# Patient Record
Sex: FEMALE | Race: WHITE | NOT HISPANIC OR LATINO | Employment: FULL TIME | URBAN - METROPOLITAN AREA
[De-identification: names, ages, dates, MRNs, and addresses within clinical notes are randomized per-mention and may not be internally consistent; named-entity substitution may affect disease eponyms.]

---

## 2017-06-05 ENCOUNTER — TELEPHONE (OUTPATIENT)
Dept: OBSTETRICS AND GYNECOLOGY | Facility: CLINIC | Age: 47
End: 2017-06-05

## 2017-06-05 DIAGNOSIS — Z12.31 OTHER SCREENING MAMMOGRAM: Primary | ICD-10-CM

## 2017-06-05 NOTE — TELEPHONE ENCOUNTER
----- Message from Marga Michael sent at 6/5/2017 10:59 AM CDT -----  Contact: Patient  X _1st Request  _  2nd Request  _  3rd Request    Who:SUSSY ISBELL [[id number]]    Why:Patient needs orders placed in the system for her annual mammogram     What Number to Call Back:[phone]    When to Expect a call back: (Before the end of the day)   -- if call after 3:00 call back will be tomorrow.

## 2017-06-19 ENCOUNTER — HOSPITAL ENCOUNTER (OUTPATIENT)
Dept: RADIOLOGY | Facility: HOSPITAL | Age: 47
Discharge: HOME | End: 2017-06-19
Attending: OBSTETRICS & GYNECOLOGY
Payer: COMMERCIAL

## 2017-06-19 VITALS — BODY MASS INDEX: 35.15 KG/M2 | HEIGHT: 62 IN | WEIGHT: 191 LBS

## 2017-06-19 DIAGNOSIS — Z12.31 OTHER SCREENING MAMMOGRAM: ICD-10-CM

## 2017-06-19 PROCEDURE — 77067 SCR MAMMO BI INCL CAD: CPT | Mod: 26,,, | Performed by: RADIOLOGY

## 2017-06-19 PROCEDURE — 77063 BREAST TOMOSYNTHESIS BI: CPT | Mod: 26,,, | Performed by: RADIOLOGY

## 2017-06-19 PROCEDURE — 77067 SCR MAMMO BI INCL CAD: CPT | Mod: TC

## 2017-06-22 ENCOUNTER — TELEPHONE (OUTPATIENT)
Dept: OBSTETRICS AND GYNECOLOGY | Facility: CLINIC | Age: 47
End: 2017-06-22

## 2017-06-22 NOTE — TELEPHONE ENCOUNTER
Ms Newell is reqeusting blood work for her hormone and vitamin D levels,testing was suggested by her primary care provider. She would like testing done before annual exam scheduled 07/17/2017

## 2017-06-22 NOTE — TELEPHONE ENCOUNTER
----- Message from Kieran West sent at 6/22/2017  8:48 AM CDT -----  Contact: Patient  x_ 1st Request  _ 2nd Request  _ 3rd Request    Who: SUSSY ISBELL [[id number]]    Why: Patient is requesting lab orders to be placed for blood test to check her hormone and vitamin D levels per her PCP. Patient is needing a call back    What Number to Call Back: [phone]    When to Expect a call back: (Before the end of the day)  -- if call after 3:00 call back will be tomorrow.

## 2017-06-22 NOTE — TELEPHONE ENCOUNTER
----- Message from Melani Dawn sent at 6/22/2017 11:39 AM CDT -----  _  1st Request  X_  2nd Request  _  3rd Request        Who: SUSSY ISBELL [[id number]]    Why:  Pt returning a call from the office. Please call back.    What Number to Call Back:[phone]    When to Expect a call back: (Before the end of the day)   -- if the call is after 12:00, the call back will be tomorrow.

## 2017-06-23 NOTE — TELEPHONE ENCOUNTER
Left voice message for patient to call office back.     Per Dr. Garner,    PCP normally checks thyroid hormone and vitamin D level.  I do not normally check follicle stimulating hormone levels or estrogen levels unless a gyn problem is encountered.

## 2017-06-23 NOTE — TELEPHONE ENCOUNTER
----- Message from Kieran West sent at 6/23/2017 12:44 PM CDT -----  Contact: Patient  x_ 1st Request  _ 2nd Request  _ 3rd Request    Who: SUSSY ISBELL [[id number]]    Why: Patient states she spoke with staff on yesterday and was waiting for a return call back. Patient is needing a call back    What Number to Call Back: [phone]    When to Expect a call back: (Before the end of the day)  -- if call after 3:00 call back will be tomorrow.

## 2017-06-23 NOTE — TELEPHONE ENCOUNTER
Patient stated that her primary care provider suggested that she have blood work for hormones and vitamin D done. Patient stated she would like to have these labs drawn before her GYN visit. Patient stated she has called three times and still doesn't have an answer. Asked patient if her primary care put orders. Patient said no. Patient stated that she would call to see if her primary care would put the orders in for her and will call back to let us know.

## 2017-06-23 NOTE — TELEPHONE ENCOUNTER
PCP normally checks thyroid hormone and vitamin D level.  I do not normally check follicle stimulating hormone levels or estrogen levels unless a gyn problem is encountered.

## 2017-06-28 ENCOUNTER — TELEPHONE (OUTPATIENT)
Dept: OBSTETRICS AND GYNECOLOGY | Facility: CLINIC | Age: 47
End: 2017-06-28

## 2017-06-28 NOTE — TELEPHONE ENCOUNTER
----- Message from Olamide Whitley sent at 6/28/2017  9:52 AM CDT -----  Contact: self  Pt needing a call back, she have question(s), she can be reached at [phone].

## 2017-06-28 NOTE — TELEPHONE ENCOUNTER
"Patient called to discuss requested blood work prior to appointment. I discuss provider recommendation-PCP normally checks thyroid hormone and vitamin D level.  I do not normally check follicle stimulating hormone levels or estrogen levels unless a gyn problem is encountered. I advised patient that she could discuss request at the office visit and there were options for lab draw after visit. 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 Assure her results can be sent to portal. She verbalized understanding  "

## 2017-07-26 ENCOUNTER — OFFICE VISIT (OUTPATIENT)
Dept: OBSTETRICS AND GYNECOLOGY | Facility: CLINIC | Age: 47
End: 2017-07-26
Attending: OBSTETRICS & GYNECOLOGY
Payer: COMMERCIAL

## 2017-07-26 VITALS
DIASTOLIC BLOOD PRESSURE: 86 MMHG | BODY MASS INDEX: 35.22 KG/M2 | HEIGHT: 62 IN | WEIGHT: 191.38 LBS | SYSTOLIC BLOOD PRESSURE: 130 MMHG

## 2017-07-26 DIAGNOSIS — Z01.419 WELL FEMALE EXAM WITH ROUTINE GYNECOLOGICAL EXAM: Primary | ICD-10-CM

## 2017-07-26 PROCEDURE — 99396 PREV VISIT EST AGE 40-64: CPT | Mod: S$GLB,,, | Performed by: OBSTETRICS & GYNECOLOGY

## 2017-07-26 PROCEDURE — 99999 PR PBB SHADOW E&M-EST. PATIENT-LVL III: CPT | Mod: PBBFAC,,, | Performed by: OBSTETRICS & GYNECOLOGY

## 2017-07-26 RX ORDER — CARVEDILOL 3.12 MG/1
TABLET ORAL
COMMUNITY
Start: 2017-07-09 | End: 2018-08-01 | Stop reason: DRUGHIGH

## 2017-07-26 RX ORDER — FLUTICASONE PROPIONATE 50 MCG
SPRAY, SUSPENSION (ML) NASAL
COMMUNITY
Start: 2017-06-23

## 2017-07-26 NOTE — PROGRESS NOTES
SUBJECTIVE:   46 y.o. female   for routine gyn exam. Patient's last menstrual period was 2017 (approximate)..  She reports periods every month until -- was 8 week interval.  Has some anxiety, sleeping problems, hot flashes.  Had normal TSH.  Wondering if she needs hormones level checked.         Past Medical History:   Diagnosis Date    Hypertension      Past Surgical History:   Procedure Laterality Date    TONSILLECTOMY, ADENOIDECTOMY       Social History     Social History    Marital status: Single     Spouse name: N/A    Number of children: N/A    Years of education: N/A     Occupational History    Not on file.     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, single, no children, nonsmoker, alcohol once a month, [facility]                 Family History   Problem Relation Age of Onset    Hyperlipidemia Father     Hypertension Father     Multiple sclerosis Mother     Breast cancer Neg Hx     Colon cancer Neg Hx     Ovarian cancer Neg Hx      OB History    Para Term  AB Living   0   0         SAB TAB Ectopic Multiple Live Births           0               Current Outpatient Prescriptions   Medication Sig Dispense Refill    carvedilol (COREG) 3.125 MG tablet       fluticasone (FLONASE) 50 mcg/actuation nasal spray        No current facility-administered medications for this visit.      Allergies: No known drug allergies     ROS:  Constitutional: no weight loss, weight gain, fever, fatigue  Eyes:  No vision changes, glasses/contacts  ENT/Mouth: No ulcers, sinus problems, ears ringing, headache  Cardiovascular: No inability to lie flat, chest pain,  "exercise intolerance, swelling, heart palpitations  Respiratory: No wheezing, coughing blood, shortness of breath, or cough  Gastrointestinal: No diarrhea, bloody stool, nausea/vomiting, constipation, gas, hemorrhoids  Genitourinary: No blood in urine, painful urination, urgency of urination, frequency of urination, incomplete emptying, incontinence, abnormal bleeding, painful periods, heavy periods, vaginal discharge, vaginal odor, painful intercourse, sexual problems, bleeding after intercourse.  Musculoskeletal: No muscle weakness  Skin/Breast: No painful breasts, nipple discharge, masses, rash, ulcers  Neurological: No passing out, seizures, numbness, headache  Endocrine: No diabetes, hypothyroid, hyperthyroid, +hot flashes, hair loss, abnormal hair growth, ance  Psychiatric: No depression, crying  Hematologic: No bruises, bleeding, swollen lymph nodes, anemia.      OBJECTIVE:   The patient appears well, alert, oriented x 3, in no distress.  /86 (BP Location: Right arm, Patient Position: Sitting, BP Method: Manual)   Ht 5' 2" (1.575 m)   Wt 86.8 kg (191 lb 5.8 oz)   LMP 06/05/2017 (Approximate)   BMI 35.00 kg/m²   NECK: no thyromegaly, trachea midline  SKIN: no acne, striae, hirsutism  CHEST: CTAB  CV: RRR  BREAST EXAM: breasts appear normal, no suspicious masses, no skin or nipple changes or axillary nodes  ABDOMEN: no hernias, masses, or hepatosplenomegaly  GENITALIA: normal external genitalia, no erythema, no discharge  URETHRA: normal urethra, normal urethral meatus  VAGINA: Normal  CERVIX: no lesions or cervical motion tenderness  UTERUS: normal size, contour, position, consistency, mobility, non-tender  ADNEXA: no mass, fullness, tenderness      ASSESSMENT:   1. Well female exam with routine gynecological exam         PLAN:       Discussed oligomenorrhea and perimenopausal status.  Discussed hormone testing and tx of sx.  Discussed SSRI/ Brisdelle.  Return to clinic in 1 year  "

## 2018-08-01 ENCOUNTER — OFFICE VISIT (OUTPATIENT)
Dept: OBSTETRICS AND GYNECOLOGY | Facility: CLINIC | Age: 48
End: 2018-08-01
Attending: OBSTETRICS & GYNECOLOGY
Payer: COMMERCIAL

## 2018-08-01 VITALS
BODY MASS INDEX: 34.77 KG/M2 | WEIGHT: 188.94 LBS | SYSTOLIC BLOOD PRESSURE: 122 MMHG | DIASTOLIC BLOOD PRESSURE: 80 MMHG | HEIGHT: 62 IN

## 2018-08-01 DIAGNOSIS — Z01.419 WELL FEMALE EXAM WITH ROUTINE GYNECOLOGICAL EXAM: Primary | ICD-10-CM

## 2018-08-01 DIAGNOSIS — Z12.39 BREAST CANCER SCREENING: ICD-10-CM

## 2018-08-01 PROCEDURE — 99396 PREV VISIT EST AGE 40-64: CPT | Mod: S$GLB,,, | Performed by: OBSTETRICS & GYNECOLOGY

## 2018-08-01 PROCEDURE — 99999 PR PBB SHADOW E&M-EST. PATIENT-LVL III: CPT | Mod: PBBFAC,,, | Performed by: OBSTETRICS & GYNECOLOGY

## 2018-08-01 PROCEDURE — 88175 CYTOPATH C/V AUTO FLUID REDO: CPT

## 2018-08-01 RX ORDER — CARVEDILOL 12.5 MG/1
TABLET ORAL
COMMUNITY
Start: 2018-07-19 | End: 2019-08-02

## 2018-08-01 NOTE — PROGRESS NOTES
SUBJECTIVE:   47 y.o. female   for routine gyn exam. Patient's last menstrual period was 2018 (exact date)..  She has no unusual complaints.  Periods are becoming more scant.         Past Medical History:   Diagnosis Date    Hypertension      Past Surgical History:   Procedure Laterality Date    TONSILLECTOMY, ADENOIDECTOMY       Social History     Social History    Marital status: Single     Spouse name: N/A    Number of children: N/A    Years of education: N/A     Occupational History    Not on file.     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, single, no children, nonsmoker, alcohol once a month, [facility]                 Family History   Problem Relation Age of Onset    Hyperlipidemia Father     Hypertension Father     Multiple sclerosis Mother     Breast cancer Neg Hx     Colon cancer Neg Hx     Ovarian cancer Neg Hx      OB History    Para Term  AB Living   0 0 0 0 0 0   SAB TAB Ectopic Multiple Live Births   0 0 0 0 0               Current Outpatient Prescriptions   Medication Sig Dispense Refill    carvedilol (COREG) 12.5 MG tablet       fluticasone (FLONASE) 50 mcg/actuation nasal spray        No current facility-administered medications for this visit.      Allergies: No known drug allergies     ROS:  Constitutional: no weight loss, weight gain, fever, fatigue  Eyes:  No vision changes, glasses/contacts  ENT/Mouth: No ulcers, sinus problems, ears ringing, headache  Cardiovascular: No inability to lie flat, chest pain, exercise intolerance, swelling, heart palpitations  Respiratory: No wheezing, coughing blood, shortness of breath, or  "cough  Gastrointestinal: No diarrhea, bloody stool, nausea/vomiting, constipation, gas, hemorrhoids  Genitourinary: No blood in urine, painful urination, urgency of urination, frequency of urination, incomplete emptying, incontinence, abnormal bleeding, painful periods, heavy periods, vaginal discharge, vaginal odor, painful intercourse, sexual problems, bleeding after intercourse.  Musculoskeletal: No muscle weakness  Skin/Breast: No painful breasts, nipple discharge, masses, rash, ulcers  Neurological: No passing out, seizures, numbness, headache  Endocrine: No diabetes, hypothyroid, hyperthyroid, hot flashes, hair loss, abnormal hair growth, ance  Psychiatric: No depression, crying  Hematologic: No bruises, bleeding, swollen lymph nodes, anemia.      OBJECTIVE:   The patient appears well, alert, oriented x 3, in no distress.  /80 (BP Location: Right arm, Patient Position: Sitting, BP Method: Large (Manual))   Ht 5' 2" (1.575 m)   Wt 85.7 kg (188 lb 15 oz)   LMP 06/11/2018 (Exact Date)   BMI 34.56 kg/m²   NECK: no thyromegaly, trachea midline  SKIN: no acne, striae, hirsutism  CHEST: CTAB  CV: RRR  BREAST EXAM: breasts appear normal, no suspicious masses, no skin or nipple changes or axillary nodes  ABDOMEN: no hernias, masses, or hepatosplenomegaly  GENITALIA: normal external genitalia, no erythema, no discharge  URETHRA: normal urethra, normal urethral meatus  VAGINA: Normal  CERVIX: no lesions or cervical motion tenderness  UTERUS: normal size, contour, position, consistency, mobility, non-tender  ADNEXA: no mass, fullness, tenderness      ASSESSMENT:   1. Well female exam with routine gynecological exam  Liquid-based pap smear, screening   2. Breast cancer screening  Mammo Digital Screening Bilat with Tomosynthesis CAD       PLAN:   Orders Placed This Encounter    Mammo Digital Screening Bilat with Tomosynthesis CAD    Liquid-based pap smear, screening     Discussed perimenopause  Return to clinic " in 1 year

## 2018-08-06 ENCOUNTER — HOSPITAL ENCOUNTER (OUTPATIENT)
Dept: RADIOLOGY | Facility: HOSPITAL | Age: 48
Discharge: HOME | End: 2018-08-06
Attending: OBSTETRICS & GYNECOLOGY
Payer: COMMERCIAL

## 2018-08-06 DIAGNOSIS — Z12.39 BREAST CANCER SCREENING: ICD-10-CM

## 2018-08-06 PROCEDURE — 77063 BREAST TOMOSYNTHESIS BI: CPT | Mod: TC,PO

## 2018-08-06 PROCEDURE — 77063 BREAST TOMOSYNTHESIS BI: CPT | Mod: 26,,, | Performed by: RADIOLOGY

## 2018-08-06 PROCEDURE — 77067 SCR MAMMO BI INCL CAD: CPT | Mod: 26,,, | Performed by: RADIOLOGY

## 2019-08-02 ENCOUNTER — OFFICE VISIT (OUTPATIENT)
Dept: OBSTETRICS AND GYNECOLOGY | Facility: CLINIC | Age: 49
End: 2019-08-02
Payer: COMMERCIAL

## 2019-08-02 VITALS
BODY MASS INDEX: 31.52 KG/M2 | HEIGHT: 62 IN | DIASTOLIC BLOOD PRESSURE: 70 MMHG | WEIGHT: 171.31 LBS | SYSTOLIC BLOOD PRESSURE: 130 MMHG

## 2019-08-02 DIAGNOSIS — Z01.419 WELL FEMALE EXAM WITH ROUTINE GYNECOLOGICAL EXAM: Primary | ICD-10-CM

## 2019-08-02 DIAGNOSIS — Z12.39 BREAST CANCER SCREENING: ICD-10-CM

## 2019-08-02 PROCEDURE — 99999 PR PBB SHADOW E&M-EST. PATIENT-LVL III: CPT | Mod: PBBFAC,,, | Performed by: OBSTETRICS & GYNECOLOGY

## 2019-08-02 PROCEDURE — 99999 PR PBB SHADOW E&M-EST. PATIENT-LVL III: ICD-10-PCS | Mod: PBBFAC,,, | Performed by: OBSTETRICS & GYNECOLOGY

## 2019-08-02 PROCEDURE — 99396 PR PREVENTIVE VISIT,EST,40-64: ICD-10-PCS | Mod: S$GLB,,, | Performed by: OBSTETRICS & GYNECOLOGY

## 2019-08-02 PROCEDURE — 99396 PREV VISIT EST AGE 40-64: CPT | Mod: S$GLB,,, | Performed by: OBSTETRICS & GYNECOLOGY

## 2019-08-02 NOTE — PROGRESS NOTES
SUBJECTIVE:   48 y.o. female   for routine gyn exam. Patient's last menstrual period was 2019..  She has no unusual complaints.  No hot flashes.          Past Medical History:   Diagnosis Date    Hypertension      Past Surgical History:   Procedure Laterality Date    TONSILLECTOMY, ADENOIDECTOMY       Social History     Socioeconomic History    Marital status: Single     Spouse name: Not on file    Number of children: Not on file    Years of education: Not on file    Highest education level: Not on file   Occupational History    Not on file   Social Needs    Financial resource strain: Not on file    Food insecurity:     Worry: Not on file     Inability: Not on file    Transportation needs:     Medical: Not on file     Non-medical: Not on file   Tobacco Use    Smoking status: Never Smoker    Smokeless tobacco: Never Used   Substance and Sexual Activity    Alcohol use: Yes     Comment: on OCC/rarely    Drug use: No    Sexual activity: Not Currently     Partners: Male     Birth control/protection: None   Lifestyle    Physical activity:     Days per week: Not on file     Minutes per session: Not on file    Stress: Not on file   Relationships    Social connections:     Talks on phone: Not on file     Gets together: Not on file     Attends Zoroastrian service: Not on file     Active member of club or organization: Not on file     Attends meetings of clubs or organizations: Not on file     Relationship status: Not on file   Other Topics Concern    Not on file   Social History Narrative     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      Allergies: No known drug allergies     ROS:  Constitutional: no weight loss, weight gain, fever, fatigue  Eyes:  No vision changes, glasses/contacts  ENT/Mouth: No ulcers, sinus problems, ears ringing, headache  Cardiovascular: No inability to lie flat, chest pain, exercise intolerance, swelling, heart palpitations  Respiratory: No wheezing, coughing blood, shortness of breath, or cough  Gastrointestinal: No diarrhea, bloody stool, nausea/vomiting, constipation, gas, hemorrhoids  Genitourinary: No blood in urine, painful urination, urgency of urination, frequency of urination, incomplete emptying, incontinence, abnormal bleeding, painful periods, heavy periods, vaginal discharge, vaginal odor, painful intercourse, sexual problems, bleeding after intercourse.  Musculoskeletal: No muscle weakness  Skin/Breast: No painful breasts, nipple discharge, masses, rash, ulcers  Neurological: No passing out, seizures, numbness, headache  Endocrine: No diabetes, hypothyroid, hyperthyroid, hot flashes, hair loss, abnormal hair growth, ance  Psychiatric: No depression, crying  Hematologic: No bruises, bleeding, swollen lymph nodes, anemia.      OBJECTIVE:   The patient appears well, alert, oriented x 3, in no distress.  /70   Ht 5' 2" (1.575 m)   Wt 77.7 kg (171 lb 4.8 oz)   LMP 03/02/2019   BMI 31.33 kg/m²   NECK: no thyromegaly, trachea midline  SKIN: no acne, striae, hirsutism  CHEST: CTAB  CV: RRR  BREAST EXAM: breasts appear normal, no suspicious masses, no skin or nipple changes or axillary nodes  ABDOMEN: no hernias, masses, or hepatosplenomegaly  GENITALIA: normal external genitalia, no erythema, no discharge  URETHRA: normal urethra, normal urethral meatus  VAGINA: " Normal  CERVIX: no lesions or cervical motion tenderness  UTERUS: normal size, contour, position, consistency, mobility, non-tender  ADNEXA: no mass, fullness, tenderness      ASSESSMENT:   1. Well female exam with routine gynecological exam     2. Breast cancer screening  Mammo Digital Screening Bilat w/ Shaq       PLAN:   Orders Placed This Encounter    Mammo Digital Screening Bilat w/ Shaq     Discussed perimenopause/ menopause  Return to clinic in 1 year

## 2020-01-27 ENCOUNTER — TELEPHONE (OUTPATIENT)
Dept: OBSTETRICS AND GYNECOLOGY | Facility: CLINIC | Age: 50
End: 2020-01-27

## 2020-02-24 ENCOUNTER — HOSPITAL ENCOUNTER (OUTPATIENT)
Dept: RADIOLOGY | Facility: HOSPITAL | Age: 50
Discharge: HOME | End: 2020-02-24
Attending: OBSTETRICS & GYNECOLOGY
Payer: COMMERCIAL

## 2020-02-24 DIAGNOSIS — Z12.39 BREAST CANCER SCREENING: ICD-10-CM

## 2020-02-24 PROCEDURE — 77067 SCR MAMMO BI INCL CAD: CPT | Mod: TC,PO

## 2020-02-24 PROCEDURE — 77067 MAMMO DIGITAL SCREENING BILAT WITH TOMOSYNTHESIS_CAD: ICD-10-PCS | Mod: 26,,, | Performed by: RADIOLOGY

## 2020-02-24 PROCEDURE — 77067 SCR MAMMO BI INCL CAD: CPT | Mod: 26,,, | Performed by: RADIOLOGY

## 2020-02-24 PROCEDURE — 77063 MAMMO DIGITAL SCREENING BILAT WITH TOMOSYNTHESIS_CAD: ICD-10-PCS | Mod: 26,,, | Performed by: RADIOLOGY

## 2020-02-24 PROCEDURE — 77063 BREAST TOMOSYNTHESIS BI: CPT | Mod: 26,,, | Performed by: RADIOLOGY

## 2021-11-29 ENCOUNTER — CLINICAL SUPPORT (OUTPATIENT)
Dept: URGENT CARE | Facility: CLINIC | Age: 51
End: 2021-11-29
Payer: COMMERCIAL

## 2021-11-29 DIAGNOSIS — Z11.9 SCREENING EXAMINATION FOR UNSPECIFIED INFECTIOUS DISEASE: Primary | ICD-10-CM

## 2021-11-29 LAB
CTP QC/QA: YES
SARS-COV-2 RDRP RESP QL NAA+PROBE: NEGATIVE

## 2021-11-29 PROCEDURE — U0002: ICD-10-PCS | Mod: QW,S$GLB,, | Performed by: PHYSICIAN ASSISTANT

## 2021-11-29 PROCEDURE — 99211 PR OFFICE/OUTPT VISIT, EST, LEVL I: ICD-10-PCS | Mod: S$GLB,,, | Performed by: PHYSICIAN ASSISTANT

## 2021-11-29 PROCEDURE — U0002 COVID-19 LAB TEST NON-CDC: HCPCS | Mod: QW,S$GLB,, | Performed by: PHYSICIAN ASSISTANT

## 2021-11-29 PROCEDURE — 99211 OFF/OP EST MAY X REQ PHY/QHP: CPT | Mod: S$GLB,,, | Performed by: PHYSICIAN ASSISTANT

## 2023-09-10 ENCOUNTER — OFFICE VISIT (OUTPATIENT)
Dept: URGENT CARE | Facility: CLINIC | Age: 53
End: 2023-09-10
Payer: COMMERCIAL

## 2023-09-10 VITALS
DIASTOLIC BLOOD PRESSURE: 98 MMHG | TEMPERATURE: 98 F | HEIGHT: 62 IN | OXYGEN SATURATION: 96 % | WEIGHT: 171 LBS | RESPIRATION RATE: 16 BRPM | SYSTOLIC BLOOD PRESSURE: 171 MMHG | BODY MASS INDEX: 31.47 KG/M2 | HEART RATE: 100 BPM

## 2023-09-10 DIAGNOSIS — S61.451A DOG BITE OF RIGHT HAND, INITIAL ENCOUNTER: Primary | ICD-10-CM
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
## 2023-09-10 PROCEDURE — 90715 TDAP VACCINE 7 YRS/> IM: CPT | Mod: S$GLB,,, | Performed by: PHYSICIAN ASSISTANT

## 2023-09-10 PROCEDURE — 99213 OFFICE O/P EST LOW 20 MIN: CPT | Mod: 25,S$GLB,, | Performed by: PHYSICIAN ASSISTANT

## 2023-09-10 PROCEDURE — 90471 IMMUNIZATION ADMIN: CPT | Mod: S$GLB,,, | Performed by: PHYSICIAN ASSISTANT

## 2023-09-10 PROCEDURE — 12041 INTMD RPR N-HF/GENIT 2.5CM/<: CPT | Mod: S$GLB,,, | Performed by: PHYSICIAN ASSISTANT

## 2023-09-10 PROCEDURE — 90471 TDAP VACCINE GREATER THAN OR EQUAL TO 7YO IM: ICD-10-PCS | Mod: S$GLB,,, | Performed by: PHYSICIAN ASSISTANT

## 2023-09-10 PROCEDURE — 99213 PR OFFICE/OUTPT VISIT, EST, LEVL III, 20-29 MIN: ICD-10-PCS | Mod: 25,S$GLB,, | Performed by: PHYSICIAN ASSISTANT

## 2023-09-10 PROCEDURE — 12041 LACERATION REPAIR: ICD-10-PCS | Mod: S$GLB,,, | Performed by: PHYSICIAN ASSISTANT

## 2023-09-10 PROCEDURE — 90715 TDAP VACCINE GREATER THAN OR EQUAL TO 7YO IM: ICD-10-PCS | Mod: S$GLB,,, | Performed by: PHYSICIAN ASSISTANT

## 2023-09-10 RX ORDER — MUPIROCIN 20 MG/G
OINTMENT TOPICAL
Status: COMPLETED | OUTPATIENT
Start: 2023-09-10 | End: 2023-09-10

## 2023-09-10 RX ORDER — AMOXICILLIN AND CLAVULANATE POTASSIUM 875; 125 MG/1; MG/1
1 TABLET, FILM COATED ORAL EVERY 12 HOURS
Qty: 14 TABLET | Refills: 0 | Status: SHIPPED | OUTPATIENT
Start: 2023-09-10 | End: 2023-09-17

## 2023-09-10 RX ADMIN — MUPIROCIN: 20 OINTMENT TOPICAL at 04:09

## 2023-09-10 NOTE — PROGRESS NOTES
"ssssssssssssss  Subjective:      Patient ID: Cynthia Newell is a 52 y.o. female.    Vitals:  height is 5' 2" (1.575 m) and weight is 77.6 kg (171 lb). Her oral temperature is 98.1 °F (36.7 °C). Her blood pressure is 171/98 (abnormal) and her pulse is 100. Her respiration is 16 and oxygen saturation is 96%.     Chief Complaint: Animal Bite    This is a 52 y.o. female who presents today with a chief complaint of dog bite on R hand that happened today about 2 hours ago. Pt stated that it was her neighbors dog that bit her. Pt was throwing her trash out and pts neighbor was holding a baby when the patient approach her neighbor and saw that the dog was on a leash and when the patient got ready to pet the dog she had her hand out for the dog to sniff her and the dog bit her hand.   Pt stated that the neighbor said that the dog just got all its shots.     Animal Bite   The incident occurred today. She came to the ER via personal transport. There is an injury to the Right hand. There is an injury to the Right ring finger and right little finger. The patient is experiencing no pain. It is unlikely that a foreign body is present. Pertinent negatives include no chest pain, no numbness and no weakness. There have been no prior injuries to these areas. She is Left-handed. Her tetanus status is unknown. There were no sick contacts. She has received no recent medical care.     Constitution: Negative for chills and fever.   Cardiovascular:  Negative for chest pain.   Respiratory:  Negative for shortness of breath.    Musculoskeletal:  Positive for pain, trauma and muscle ache. Negative for joint pain, joint swelling, abnormal ROM of joint and muscle cramps.   Skin:  Positive for laceration. Negative for erythema and bruising.   Neurological:  Negative for numbness, tingling and tremors.      Past Medical History:   Diagnosis Date    Hypertension        Past Surgical History:   Procedure Laterality Date    TONSILLECTOMY, " ADENOIDECTOMY         Family History   Problem Relation Age of Onset    Hyperlipidemia Father     Hypertension Father     Multiple sclerosis Mother     Breast cancer Neg Hx     Colon cancer Neg Hx     Ovarian cancer Neg Hx        Social History     Socioeconomic History    Marital status: Single   Tobacco Use    Smoking status: Never    Smokeless tobacco: Never   Substance and Sexual Activity    Alcohol use: Yes     Comment: on OCC/rarely    Drug use: No    Sexual activity: Not Currently     Partners: Male     Birth control/protection: None   Social History Narrative     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 for 7 days 14 tablet 0     Current Facility-Administered Medications   Medication Dose Route Frequency Provider Last Rate Last Admin    mupirocin 2 % ointment   Topical (Top) 1 time in Clinic/HOD Judy Obregon PA-C           Review of patient's allergies indicates:   Allergen Reactions    No known drug allergies        Objective:     Physical Exam   Constitutional: She is oriented to person, place, and time.  Non-toxic appearance. She does not appear ill. No distress.   Cardiovascular: Normal rate, regular rhythm and normal pulses.   Pulmonary/Chest: Effort normal. No respiratory distress.   Abdominal: Normal appearance.   Musculoskeletal: Normal range of motion.         General: Tenderness present. No swelling. Normal range of motion.      Right hand: She exhibits normal capillary refill. Decreased sensation is not present in the ulnar distribution, is not present in the medial distribution and is not present in the radial distribution. Motor /Testing: The patient has  "normal right wrist strength.        Hands:    Neurological: no focal deficit. She is alert and oriented to person, place, and time. She displays no weakness. No sensory deficit. Coordination normal.   Skin: Skin is warm, dry, not diaphoretic, not pale and no rash. Capillary refill takes less than 2 seconds. No erythema   Psychiatric: Her behavior is normal. Mood, judgment and thought content normal.   Nursing note and vitals reviewed.          Laceration Repair    Date/Time: 9/10/2023 3:30 PM    Performed by: Judy Obregon PA-C  Authorized by: Judy Obregon PA-C  Consent Done: Yes  Consent: Verbal consent obtained.  Risks and benefits: risks, benefits and alternatives were discussed  Consent given by: patient  Patient understanding: patient states understanding of the procedure being performed  Required items: required blood products, implants, devices, and special equipment available  Patient identity confirmed:  and name  Time out: Immediately prior to procedure a "time out" was called to verify the correct patient, procedure, equipment, support staff and site/side marked as required.  Body area: upper extremity  Location details: right hand  Laceration length: 1.5 cm  Foreign bodies: no foreign bodies  Tendon involvement: none  Nerve involvement: none  Vascular damage: no  Anesthesia: local infiltration    Anesthesia:  Local Anesthetic: lidocaine 1% without epinephrine  Anesthetic total: 1.5 mL    Patient sedated: no  Preparation: Patient was prepped and draped in the usual sterile fashion.  Irrigation solution: saline  Irrigation method: syringe  Amount of cleaning: extensive  Debridement: none  Degree of undermining: none  Skin closure: 4-0 Prolene  Number of sutures: 2  Technique: simple  Approximation: loose  Approximation difficulty: simple  Dressing: antibiotic ointment, 4x4 sterile gauze and splint for protection  Patient tolerance: Patient tolerated the procedure well with no immediate " complications          Assessment:     1. Dog bite of right hand, initial encounter        Plan:       Dog bite of right hand, initial encounter  -     (In Office Administered) Tdap Vaccine  -     SPLINT FOR HOME USE  -     amoxicillin-clavulanate 875-125mg (AUGMENTIN) 875-125 mg per tablet; Take 1 tablet by mouth every 12 (twelve) hours. for 7 days  Dispense: 14 tablet; Refill: 0  -     mupirocin 2 % ointment  -     Laceration Repair    Discussed patient that we normally do not tried to close dog bites however due to her is gaping especially with movement I advised that we do loose suturing.  Discussed the risks and benefits and she agreed to the treatment of loose suturing.    I have reviewed the patient chart and pertinent past imaging/labs.  Patient Instructions   The wound is cleansed, debrided of foreign material as much as possible, and dressed. Be alert for any signs of infection (redness, pus, pain, increased swelling or fever) and call if such occurs. Keep the wound clean and dry. You may remove the bandage to shower as usual after the first 24 hours to prevent crusting over suture knots, but do not soak the area in water (no tubs or swimming) until the sutures are removed. After removing the bandage, wash the area with soap and water at least twice daily unless more frequently soiled, then clean more often. After 48 hours, then leave open to air and do not bandage. Do not use neosporin/polysporin. You were prescribed augmentin for a antimicrobial. If sutures or staples are in place, it is important to keep your appointment for removal. You should return in 10-14 days for removal. Do not remove staples/sutures yourself. All wounds will leave a scar. To prevent the scar from bleaching in the sun make sure to wear sunscreen in the sun after the wound has healed. Do not apply a great deal of tension or stress to the suture line. Tetanus vaccination status reviewed: Tdap(tetanus) vaccination indicated and  given today.

## 2023-09-10 NOTE — PROCEDURES
"Laceration Repair    Date/Time: 9/10/2023 3:30 PM    Performed by: Judy Obregon PA-C  Authorized by: Judy Obregon PA-C  Consent Done: Yes  Consent: Verbal consent obtained.  Risks and benefits: risks, benefits and alternatives were discussed  Consent given by: patient  Patient understanding: patient states understanding of the procedure being performed  Required items: required blood products, implants, devices, and special equipment available  Patient identity confirmed:  and name  Time out: Immediately prior to procedure a "time out" was called to verify the correct patient, procedure, equipment, support staff and site/side marked as required.  Body area: upper extremity  Location details: right hand  Laceration length: 1.5 cm  Foreign bodies: no foreign bodies  Tendon involvement: none  Nerve involvement: none  Vascular damage: no  Anesthesia: local infiltration    Anesthesia:  Local Anesthetic: lidocaine 1% without epinephrine  Anesthetic total: 1.5 mL    Patient sedated: no  Preparation: Patient was prepped and draped in the usual sterile fashion.  Irrigation solution: saline  Irrigation method: syringe  Amount of cleaning: extensive  Debridement: none  Degree of undermining: none  Skin closure: 4-0 Prolene  Number of sutures: 2  Technique: simple  Approximation: loose  Approximation difficulty: simple  Dressing: antibiotic ointment, 4x4 sterile gauze and splint for protection  Patient tolerance: Patient tolerated the procedure well with no immediate complications        "

## 2023-09-10 NOTE — PATIENT INSTRUCTIONS
The wound is cleansed, debrided of foreign material as much as possible, and dressed. Be alert for any signs of infection (redness, pus, pain, increased swelling or fever) and call if such occurs. Keep the wound clean and dry. You may remove the bandage to shower as usual after the first 24 hours to prevent crusting over suture knots, but do not soak the area in water (no tubs or swimming) until the sutures are removed. After removing the bandage, wash the area with soap and water at least twice daily unless more frequently soiled, then clean more often. After 48 hours, then leave open to air and do not bandage. Do not use neosporin/polysporin. You were prescribed augmentin for a antimicrobial. If sutures or staples are in place, it is important to keep your appointment for removal. You should return in 10-14 days for removal. Do not remove staples/sutures yourself. All wounds will leave a scar. To prevent the scar from bleaching in the sun make sure to wear sunscreen in the sun after the wound has healed. Do not apply a great deal of tension or stress to the suture line. Tetanus vaccination status reviewed: Tdap(tetanus) vaccination indicated and given today.

## 2023-09-21 ENCOUNTER — CLINICAL SUPPORT (OUTPATIENT)
Dept: URGENT CARE | Facility: CLINIC | Age: 53
End: 2023-09-21
Payer: COMMERCIAL

## 2023-09-21 VITALS
RESPIRATION RATE: 14 BRPM | HEART RATE: 115 BPM | BODY MASS INDEX: 31.47 KG/M2 | OXYGEN SATURATION: 99 % | SYSTOLIC BLOOD PRESSURE: 149 MMHG | DIASTOLIC BLOOD PRESSURE: 98 MMHG | HEIGHT: 62 IN | TEMPERATURE: 98 F | WEIGHT: 171 LBS

## 2023-09-21 DIAGNOSIS — Z48.02 ENCOUNTER FOR REMOVAL OF SUTURES: Primary | ICD-10-CM

## 2023-09-21 PROCEDURE — 99499 NO LOS: ICD-10-PCS | Mod: S$GLB,,,

## 2023-09-21 PROCEDURE — 99499 UNLISTED E&M SERVICE: CPT | Mod: S$GLB,,,

## 2025-02-09 ENCOUNTER — NURSE TRIAGE (OUTPATIENT)
Dept: ADMINISTRATIVE | Facility: CLINIC | Age: 55
End: 2025-02-09
Payer: COMMERCIAL

## 2025-02-09 NOTE — TELEPHONE ENCOUNTER
Reason for Disposition   Age > 50 years    Additional Information   [1] Pain or burning with passing urine (urination) AND [2] female   Negative: Shock suspected (e.g., cold/pale/clammy skin, too weak to stand, low BP, rapid pulse)   Negative: Sounds like a life-threatening emergency to the triager   Negative: [1] Unable to urinate (or only a few drops) > 4 hours AND [2] bladder feels very full (e.g., palpable bladder or strong urge to urinate)   Negative: Vomiting   Negative: Patient sounds very sick or weak to the triager   Negative: [1] SEVERE pain with urination (e.g., excruciating) AND [2] not improved after 2 hours of pain medicine and Sitz bath   Negative: Fever > 100.4 F (38.0 C)   Negative: Side (flank) or lower back pain present   Negative: Diabetes mellitus or weak immune system (e.g., HIV positive, cancer chemo, splenectomy, organ transplant, chronic steroids)   Negative: Bedridden (e.g., CVA, chronic illness, recovering from surgery)   Negative: Artificial heart valve or artificial joint   Negative: Unusual vaginal discharge (e.g., bad smelling, yellow, green, or foamy-white)   Negative: Patient is worried they have a sexually transmitted infection (STI)   Negative: Possibility of pregnancy   Negative: Blood in urine (red, pink, or tea-colored)    Protocols used: Urinary Symptoms-A-AH, Urination Pain - Female-A-AH  Pt requesting an antibiotic to be called in for a UTI. Reports burning after urination.  Advised pt the Provider on call will not call in antibiotics. Pt refused an office appointment and a Virtual visit. States she will go to Urgent Care.